# Patient Record
Sex: FEMALE | Race: BLACK OR AFRICAN AMERICAN | Employment: FULL TIME | URBAN - METROPOLITAN AREA
[De-identification: names, ages, dates, MRNs, and addresses within clinical notes are randomized per-mention and may not be internally consistent; named-entity substitution may affect disease eponyms.]

---
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
## 2020-01-12 ENCOUNTER — COMMUNICATION - HEALTHEAST (OUTPATIENT)
Dept: PEDIATRICS | Facility: CLINIC | Age: 20
End: 2020-01-12

## 2020-01-12 DIAGNOSIS — E28.2 PCOS (POLYCYSTIC OVARIAN SYNDROME): ICD-10-CM

## 2020-01-12 DIAGNOSIS — Z78.9 USES BIRTH CONTROL: ICD-10-CM

## 2020-01-30 ENCOUNTER — COMMUNICATION - HEALTHEAST (OUTPATIENT)
Dept: PEDIATRICS | Facility: CLINIC | Age: 20
End: 2020-01-30

## 2020-04-28 ENCOUNTER — COMMUNICATION - HEALTHEAST (OUTPATIENT)
Dept: SCHEDULING | Facility: CLINIC | Age: 20
End: 2020-04-28

## 2020-10-28 ENCOUNTER — COMMUNICATION - HEALTHEAST (OUTPATIENT)
Dept: PEDIATRICS | Facility: CLINIC | Age: 20
End: 2020-10-28

## 2020-10-28 DIAGNOSIS — Z78.9 USES BIRTH CONTROL: ICD-10-CM

## 2020-10-28 DIAGNOSIS — E28.2 PCOS (POLYCYSTIC OVARIAN SYNDROME): ICD-10-CM

## 2020-11-03 ENCOUNTER — COMMUNICATION - HEALTHEAST (OUTPATIENT)
Dept: PEDIATRICS | Facility: CLINIC | Age: 20
End: 2020-11-03

## 2020-11-03 DIAGNOSIS — E28.2 PCOS (POLYCYSTIC OVARIAN SYNDROME): ICD-10-CM

## 2020-11-03 DIAGNOSIS — Z78.9 USES BIRTH CONTROL: ICD-10-CM

## 2020-11-07 ENCOUNTER — COMMUNICATION - HEALTHEAST (OUTPATIENT)
Dept: PEDIATRICS | Facility: CLINIC | Age: 20
End: 2020-11-07

## 2020-11-07 DIAGNOSIS — Z78.9 USES BIRTH CONTROL: ICD-10-CM

## 2020-11-07 DIAGNOSIS — E28.2 PCOS (POLYCYSTIC OVARIAN SYNDROME): ICD-10-CM

## 2020-11-09 ENCOUNTER — OFFICE VISIT - HEALTHEAST (OUTPATIENT)
Dept: FAMILY MEDICINE | Facility: CLINIC | Age: 20
End: 2020-11-09

## 2020-11-09 ENCOUNTER — RECORDS - HEALTHEAST (OUTPATIENT)
Dept: GENERAL RADIOLOGY | Facility: CLINIC | Age: 20
End: 2020-11-09

## 2020-11-09 ENCOUNTER — COMMUNICATION - HEALTHEAST (OUTPATIENT)
Dept: FAMILY MEDICINE | Facility: CLINIC | Age: 20
End: 2020-11-09

## 2020-11-09 DIAGNOSIS — M25.522 LEFT ELBOW PAIN: ICD-10-CM

## 2020-11-09 DIAGNOSIS — M25.522 PAIN IN LEFT ELBOW: ICD-10-CM

## 2020-11-09 DIAGNOSIS — M25.532 LEFT WRIST PAIN: ICD-10-CM

## 2020-11-09 DIAGNOSIS — M25.532 PAIN IN LEFT WRIST: ICD-10-CM

## 2020-11-12 ENCOUNTER — OFFICE VISIT - HEALTHEAST (OUTPATIENT)
Dept: FAMILY MEDICINE | Facility: CLINIC | Age: 20
End: 2020-11-12

## 2020-11-12 ENCOUNTER — COMMUNICATION - HEALTHEAST (OUTPATIENT)
Dept: FAMILY MEDICINE | Facility: CLINIC | Age: 20
End: 2020-11-12

## 2020-11-12 DIAGNOSIS — S60.212D CONTUSION OF LEFT WRIST, SUBSEQUENT ENCOUNTER: ICD-10-CM

## 2020-11-12 DIAGNOSIS — S50.02XD CONTUSION OF LEFT ELBOW, SUBSEQUENT ENCOUNTER: ICD-10-CM

## 2020-11-25 ENCOUNTER — OFFICE VISIT - HEALTHEAST (OUTPATIENT)
Dept: FAMILY MEDICINE | Facility: CLINIC | Age: 20
End: 2020-11-25

## 2020-11-25 DIAGNOSIS — R51.9 CHRONIC NONINTRACTABLE HEADACHE, UNSPECIFIED HEADACHE TYPE: ICD-10-CM

## 2020-11-25 DIAGNOSIS — G89.29 CHRONIC NONINTRACTABLE HEADACHE, UNSPECIFIED HEADACHE TYPE: ICD-10-CM

## 2020-11-25 DIAGNOSIS — E28.2 PCOS (POLYCYSTIC OVARIAN SYNDROME): ICD-10-CM

## 2020-11-25 DIAGNOSIS — Z78.9 USES BIRTH CONTROL: ICD-10-CM

## 2020-11-25 DIAGNOSIS — J45.990 EXERCISE-INDUCED ASTHMA: ICD-10-CM

## 2020-11-25 RX ORDER — SUMATRIPTAN 50 MG/1
50 TABLET, FILM COATED ORAL
Qty: 10 TABLET | Refills: 3 | Status: SHIPPED | OUTPATIENT
Start: 2020-11-25

## 2020-11-25 RX ORDER — ALBUTEROL SULFATE 90 UG/1
2 AEROSOL, METERED RESPIRATORY (INHALATION) EVERY 4 HOURS PRN
Qty: 1 INHALER | Refills: 3 | Status: SHIPPED | OUTPATIENT
Start: 2020-11-25

## 2021-02-25 ENCOUNTER — NURSE TRIAGE (OUTPATIENT)
Dept: NURSING | Facility: CLINIC | Age: 21
End: 2021-02-25

## 2021-02-25 NOTE — TELEPHONE ENCOUNTER
Dee reports vomiting 3x this past hour, and had 2 episodes of putty like stools.    Vomited yellowish fluid.  On and off abdominal pain.  Nausea and dry heaves.  Slight lightheadedness.    Thinks she had food poisoning - had [facility] chicken sandwich yesterday and home made barbacoa tacos.    Per protocol, advised home care. Care advice reviewed. Patient verbalizes understanding. Advised to call back with further questions/concerns.     Jane Cao RN/[place] Nurse Advisor          Additional Information    Negative: Shock suspected (e.g., cold/pale/clammy skin, too weak to stand, low BP, rapid pulse)    Negative: Difficult to awaken or acting confused (e.g., disoriented, slurred speech)    Negative: Sounds like a life-threatening emergency to the triager    Negative: SEVERE vomiting (e.g., 6 or more times/day)    Negative: MODERATE vomiting (e.g., 3 - 5 times/day) and age > 60    Negative: Vomiting contains bile (green color)    Negative: Vomiting red blood or black (coffee ground) material    Negative: Insulin-dependent diabetes and glucose > 240 mg/dL (13 mmol/L)    Negative: Recent head injury (within 3 days)    Negative: Recent abdominal injury (within 7 days)    Negative: Drinking very little and has signs of dehydration (e.g., no urine > 12 hours, very dry mouth, very lightheaded)    Negative: Constant abdominal pain lasting > 2 hours    Negative: High-risk adult (e.g., brain tumor, V-P shunt, hernia)    Negative: Severe pain in one eye    Negative: Patient sounds very sick or weak to the triager    Negative: Vomiting and abdomen looks much more swollen than usual    Negative: Fever > 103 F (39.4 C)    Negative: Fever > 101 F (38.3 C) and over 60 years of age    Negative: Fever > 100.0 F (37.8 C) and has a weak immune system (e.g., HIV positive, cancer chemo, organ transplant, splenectomy, chronic steroids)    Negative: Fever > 100.0 F (37.8 C) and bedridden (e.g., nursing home patient, stroke, chronic  illness, recovering from surgery)    Negative: Taking any of the following medications: digoxin (Lanoxin), lithium, theophylline, phenytoin (Dilantin)    Negative: SEVERE headache and vomiting    Negative: MILD to MODERATE vomiting (e.g., 1-5 times/day) and lasts > 48 hours (2 days)    Negative: Fever present > 3 days (72 hours)    Negative: Patient wants to be seen    Negative: Vomiting a prescription medication    Negative: Alcohol abuse, known or suspected    Negative: Vomiting is a chronic symptom (recurrent or ongoing AND lasting > 4 weeks)    Negative: Vomiting    Vomiting with diarrhea    Protocols used: VOMITING-A-OH

## 2021-03-07 ENCOUNTER — HEALTH MAINTENANCE LETTER (OUTPATIENT)
Age: 21
End: 2021-03-07

## 2021-05-26 ASSESSMENT — PATIENT HEALTH QUESTIONNAIRE - PHQ9: SUM OF ALL RESPONSES TO PHQ QUESTIONS 1-9: 3

## 2021-05-27 NOTE — PROGRESS NOTES
Assessment     1. Abdominal pain, generalized    2. PCOS (polycystic ovarian syndrome)    3. Chronic back pain, unspecified back location, unspecified back pain laterality      Patient has already had an abdominal and pelvic ultrasound which were normal aside from multiple cysts in ovaries c/w PCOS.  Labs have been normal for this complaint in the past.    Plan:     Discussed at length the need to be back on birth control.  [facility] is turned back on.  Recommend barrier protection to prevent pregnancy and STDs.  OTC for back and abdominal pain.  Call with concerns or worsening.    Subjective:      HPI: Dee Sullivan is a 18 y.o. female who presents for abdominal cramping. She states that she was diagnosed last fall with PCOS.She states that she stopped taking birth control in Janurary due to a lapse in health insurance coverage. She has not had a period since February. Off OCP, she states that she has vaginal discharge around the time of her period but no bleeding. She is sexually active and has had multiple negative pregnancy tests. She states that she has been experiencing back soreness and abdominal cramping.    ROS: Positive for abdominal pain. All other systems negative    PFSH:  She attends college.   Social: No tobacco use.     No past medical history on file.  No past surgical history on file.  Patient has no known allergies.  Outpatient Medications Prior to Visit   Medication Sig Dispense Refill     albuterol (PROAIR HFA;PROVENTIL HFA;VENTOLIN HFA) 90 mcg/actuation inhaler Inhale 2 puffs every 6 (six) hours as needed for wheezing. 1 Inhaler 2     metFORMIN (GLUCOPHAGE XR) 500 MG 24 hr tablet Take 1 tablet (500 mg total) by mouth daily with supper. 30 tablet 3     norgestrel-ethinyl estradiol (LO/OVRAL) 0.3-30 mg-mcg per tablet Take 1 tablet by mouth daily. 3 Package 3     SUMAtriptan (IMITREX) 50 MG tablet Take 1 tablet (50 mg total) by mouth every 2 (two)  "hours as needed for migraine. 10 tablet 0     No facility-administered medications prior to visit.      Family History   Problem Relation Age of Onset     Hypothyroidism Mother      Hypertension Mother      Hypertension Brother      Hypertension Maternal Grandmother      Hyperlipidemia Maternal Grandmother      Arrhythmia Maternal Grandmother      No Medical Problems Father      No Medical Problems Paternal Grandmother      Asthma Brother      Social History     Social History Narrative     Not on file     Patient Active Problem List   Diagnosis   (none) - all problems resolved or deleted       Review of Systems  Remainder of 12 point ROS negative      Objective:     Vitals:    04/15/19 1559   BP: 110/72   Pulse: 78   Weight: 136 lb 14.4 oz (62.1 kg)   Height: 4' 10.86\" (1.495 m)       Physical Exam:     Alert, no acute distress.   Lungs have good air entry bilaterally, no wheezes or crackles.  No prolongation of expiratory phase.   No tachypnea, retractions, or increased work of breathing.  Cardiac exam regular rate and rhythm, normal S1 and S2.  Abdomen is soft and nontender, bowel sounds are present, no hepatosplenomegaly or mass palpable. Lower abdominal bloating and mild pain to palpation.  Musculoskeletal:  Normal exam for back  Skin, clear without rash      ADDITIONAL HISTORY SUMMARIZED (2): None.  DECISION TO OBTAIN EXTRA INFORMATION (1): None.   RADIOLOGY TESTS (1): None.  LABS (1): None.  MEDICINE TESTS (1): None.  INDEPENDENT REVIEW (2 each): None.     The visit lasted a total of 15 minutes face to face with the patient. Over 50% of the time was spent counseling and educating the patient about abdominal cramping.    I, Sarah Brooks, am scribing for and in the presence of, Dr. Land.    I, Dr. Land, personally performed the services described in this documentation, as scribed by Sarah Brooks in my presence, and it is both accurate and complete.    Total data points: 0    "

## 2021-05-27 NOTE — TELEPHONE ENCOUNTER
Triage call:   Patient calling to report intermittent abdominal pain that has gotten worse over the last week, a change in her vaginal discharge and no period since January. Has been off birth control since December due to insurance issues. Patient reports that she has taken a pregnancy test and is not pregnant. Patient reports she was diagnosed with PCOS. Pain is located mainly in her Lower abdomen but intermittently travels to her lower back and side. Patient reports that she had leg pain last night- patient reports normal bowel and bladder function. Pain is rated as moderate today. Reports that she has had nausea but no vomiting.      Triaged to be seen in the clinic today, reviewed additional care advice with patient and she verbalizes understanding. Patient warm transferred to scheduling for appointment. Appointment scheduled for today @ 4pm with PCP.     Naida Lyons RN [facility] Care Connection Triage/Med Refill 4/15/2019 1:40 PM    Reason for Disposition    MODERATE OR MILD pain that comes and goes (cramps) lasts > 24 hours    Protocols used: ABDOMINAL PAIN - FEMALE-A-OH

## 2021-05-30 VITALS — WEIGHT: 131 LBS | BODY MASS INDEX: 26.41 KG/M2 | HEIGHT: 59 IN

## 2021-05-31 VITALS — BODY MASS INDEX: 26.41 KG/M2 | WEIGHT: 131 LBS | HEIGHT: 59 IN

## 2021-05-31 VITALS — WEIGHT: 134 LBS | HEIGHT: 60 IN | BODY MASS INDEX: 26.31 KG/M2

## 2021-05-31 VITALS — HEIGHT: 59 IN | WEIGHT: 132 LBS | BODY MASS INDEX: 26.61 KG/M2

## 2021-05-31 VITALS — WEIGHT: 130 LBS | BODY MASS INDEX: 26.21 KG/M2 | HEIGHT: 59 IN

## 2021-05-31 VITALS — BODY MASS INDEX: 27.01 KG/M2 | HEIGHT: 59 IN | WEIGHT: 134 LBS

## 2021-06-01 VITALS — BODY MASS INDEX: 25.52 KG/M2 | WEIGHT: 130 LBS | HEIGHT: 60 IN

## 2021-06-01 VITALS — BODY MASS INDEX: 26.25 KG/M2 | WEIGHT: 132.2 LBS

## 2021-06-01 VITALS — WEIGHT: 132.8 LBS | BODY MASS INDEX: 26.37 KG/M2

## 2021-06-01 VITALS — WEIGHT: 131.6 LBS | BODY MASS INDEX: 26.14 KG/M2

## 2021-06-02 VITALS — WEIGHT: 136.9 LBS | HEIGHT: 59 IN | BODY MASS INDEX: 27.6 KG/M2

## 2021-06-02 NOTE — PROGRESS NOTES
[facility] Well Child Check    ASSESSMENT & PLAN  Dee Sullivan is a 19 y.o. who has normal growth and normal development.    Diagnoses and all orders for this visit:    Encounter for annual health examination  -     Chlamydia trachomatis & Neisseria gonorrhoeae, Amplified Detection  -     Hemoglobin  -     HIV Antigen/Antibody Screening [place]  -     Hearing Screening  -     Lipid Profile  -     Vitamin D, Total (25-Hydroxy)    Exercise-induced asthma  -     albuterol (PROAIR HFA;PROVENTIL HFA;VENTOLIN HFA) 90 mcg/actuation inhaler; Inhale 2 puffs every 4 (four) hours as needed for wheezing.  Dispense: 1 Inhaler; Refill: 6    PCOS (polycystic ovarian syndrome)  -     norgestrel-ethinyl estradiol (LO/OVRAL) 0.3-30 mg-mcg per tablet; Take 1 tablet by mouth daily.  Dispense: 3 Package; Refill: 3    Uses birth control  -     norgestrel-ethinyl estradiol (LO/OVRAL) 0.3-30 mg-mcg per tablet; Take 1 tablet by mouth daily.  Dispense: 3 Package; Refill: 3    Other orders  -     Influenza, Seasonal Quad, PF =/> 6months  -     Meningococcal B (PF)       Contraception Management  Happy with current OCP  Refills provided x one year  Chlamydia and HIV screening today    Intermittent Asthma - exercise induced  Stable and doing well  Benefits from albuterol use with vigorous exercise  Has spacer already  Refills provided  AAP completed and reviewed    Return to clinic in 1 year for a Well Child Check or sooner as needed       IMMUNIZATIONS/LABS  Immunizations were reviewed and orders were placed as appropriate.  I have discussed the risks and benefits of all of the vaccine components with the patient/parents.  All questions have been answered.    REFERRALS  Dental:  Recommend routine dental care as appropriate., The patient has already established care with a dentist.  Other:  No additional referrals were made at this time.    ANTICIPATORY GUIDANCE  I have reviewed age appropriate anticipatory guidance.    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 No    Do you have any forms that need to be filled out? No        Do you have any significant health concerns in your family history?: No  Family History   Problem Relation Age of Onset     Hypothyroidism Mother      Hypertension Mother      Hypertension Brother      Hypertension Maternal Grandmother      Hyperlipidemia Maternal Grandmother      Arrhythmia Maternal Grandmother      No Medical Problems Father      No Medical Problems Paternal Grandmother      Asthma Brother      Since your last visit, have there been any major changes in your family, such as a move, job change, separation, divorce, or death in the family?: No  Has a lack of transportation kept you from medical appointments?: No    Home  Who lives in your home?:  Mom And Aunt   Social History     Patient does not qualify to have social determinant information on file (likely too young).   Social History Narrative     Not on file     Do you have any concerns about losing your housing?: No  Is your housing safe and comfortable?: Yes  Do you have any  trouble with sleep?:  No    Education  What school do you child attend?:  Working   What grade are you in?:  Graduated in spring 2018  Attended one semester of college last fall but decided to take a pause after that to work and think more about her goals and plans - thinking about going into business      Eating  Do you eat regular meals including fruits and vegetables?:  yes  What are you drinking (cow's milk, water, soda, juice, sports drinks, energy drinks, etc)?: water, soda, juice, sports drinks and [place] milk  Have you been worried that you don't have enough food?: No  Do you have concerns about your body or appearance?:  No    Activities  Do you have friends?:  no  Do you get at least one hour of physical activity per day?:  no  How many hours a day are you in front of a screen other than for schoolwork (computer, TV, phone)?:  2  What do you do for exercise?:  Gym  Do you have interest/participate in community activities/volunteers/school sports?:  no    MENTAL HEALTH SCREENING  PHQ-2 Total Score: 1 (10/31/2019 12:00 PM)    PHQ-9 Total Score: 3 (10/31/2019 12:00 PM)      VISION/HEARING  Vision: Patient is already followed by a vision specialist  Hearing:  Completed. See Results    No exam data present    TB Risk Assessment:  The patient and/or parent/guardian answer positive to:  no known risk of TB    Dyslipidemia Risk Screening  Have either of your parents or any of your grandparents had a stroke or heart attack before age 55?: No  Any parents with high cholesterol or currently taking medications to treat?: No     Dental  When was the last time you saw the dentist?: 1-3 months ago   Parent/Guardian declines the fluoride varnish application today. Fluoride not applied today.    Patient Active Problem List   Diagnosis     Exercise-induced asthma       Drugs  Does the patient use tobacco/alcohol/drugs?:    Some alcohol (roughly 5 glasses wine per month)  Denies smoking, vaping or drug use    Safety  Does  "the patient have any safety concerns (peer or home)?:  no  Does the patient use safety belts, helmets and other safety equipment?:  yes    Sex  Have you ever had sex?:  Yes   Long-term partner x three years  Uses condoms and OCP for birth control  LMP - 2 days ago (currently has period)    MEASUREMENTS  Height:  4' 11\" (1.499 m)  Weight: 146 lb (66.2 kg)  BMI: Body mass index is 29.49 kg/m .  Blood Pressure: 100/60  Blood pressure percentiles are not available for patients who are 18 years or older.    PHYSICAL EXAM  GEN: alert, well appearing  EYES: clear, nl red reflex  R EAR: canal clear, TM pearly gray  L EAR: canal clear, TM pearly gray  NOSE: clear  OROPHARYNX: clear  NECK: supple, no significant LAD  CVS: RRR, no murmur  LUNGS: clear, no increased work of breathing  ABD: soft, non-tender, non-distended  : deferred  EXT: warm, well perfused, no swelling  MSK: nl muscle bulk, spine straight  NEURO: CN grossly intact, nl strength in UE and LE, nl gait, no dysmetria  SKIN: clear        Terrie Salgado MD    "

## 2021-06-02 NOTE — TELEPHONE ENCOUNTER
Refill Given    Refill given per Policy, patient informed they are overdue for Labs and Physical     Veronica Mena, Care Connection Triage/Med Refill 10/8/2019    Requested Prescriptions   Pending Prescriptions Disp Refills     norgestrel-ethinyl estradiol (LO/OVRAL) 0.3-30 mg-mcg per tablet 3 Package 3     Sig: Take 1 tablet by mouth daily.       Oral Contraceptives Protocol Passed - 10/7/2019  9:38 AM        Passed - Visit with PCP or prescribing provider visit in last 12 months      Last office visit with prescriber/PCP: 4/15/2019 Estiven Land MD OR same dept: 4/15/2019 Estiven Land MD OR same specialty: 4/15/2019 Estiven Land MD  Last physical: 7/28/2017 Last MTM visit: Visit date not found   Next visit within 3 mo: Visit date not found  Next physical within 3 mo: Visit date not found  Prescriber OR PCP: Estiven Land MD  Last diagnosis associated with med order: 1. PCOS (polycystic ovarian syndrome)  - norgestrel-ethinyl estradiol (LO/OVRAL) 0.3-30 mg-mcg per tablet; Take 1 tablet by mouth daily.  Dispense: 3 Package; Refill: 3    2. Uses birth control  - norgestrel-ethinyl estradiol (LO/OVRAL) 0.3-30 mg-mcg per tablet; Take 1 tablet by mouth daily.  Dispense: 3 Package; Refill: 3    If protocol passes may refill for 12 months if within 3 months of last provider visit (or a total of 15 months).

## 2021-06-03 VITALS
HEART RATE: 84 BPM | WEIGHT: 146 LBS | HEIGHT: 59 IN | SYSTOLIC BLOOD PRESSURE: 100 MMHG | BODY MASS INDEX: 29.43 KG/M2 | OXYGEN SATURATION: 98 % | DIASTOLIC BLOOD PRESSURE: 60 MMHG

## 2021-06-03 NOTE — TELEPHONE ENCOUNTER
----- Message from Terrie Salgado MD sent at 11/4/2019  2:37 PM CST -----  Please let Dee know that nearly all of her blood and urine lab work was normal.  Chlamydia is negative, HIV is negative, lipid panel is normal and hemoglobin is normal.  The only thing that was a bit low is her vitamin D level at 22 - I would recommend that she take a vitamin D supplement of 2000 iu daily.

## 2021-06-03 NOTE — TELEPHONE ENCOUNTER
Left patient a message to return phone call. Please relay the below message when she calls back. Thank you, Cecy

## 2021-06-03 NOTE — TELEPHONE ENCOUNTER
Pt is calling in to get message that was left by the clinic today. Message from physician was relayed to patient. Pt verbalized understanding. Advised pt to call back if she has further questions or concerns.    Pepe Greer RN Care Connection Triage/Medication Refill

## 2021-06-03 NOTE — TELEPHONE ENCOUNTER
Notes recorded by Inna Gooden CMA on 11/4/2019 at 3:51 PM CST  Called with no answer or Voicemail please try again  ------

## 2021-06-04 VITALS
OXYGEN SATURATION: 99 % | DIASTOLIC BLOOD PRESSURE: 72 MMHG | SYSTOLIC BLOOD PRESSURE: 118 MMHG | WEIGHT: 150 LBS | HEART RATE: 71 BPM | BODY MASS INDEX: 30.3 KG/M2

## 2021-06-05 VITALS
OXYGEN SATURATION: 96 % | WEIGHT: 159.06 LBS | SYSTOLIC BLOOD PRESSURE: 108 MMHG | DIASTOLIC BLOOD PRESSURE: 66 MMHG | TEMPERATURE: 97.9 F | HEART RATE: 83 BPM | BODY MASS INDEX: 32.13 KG/M2

## 2021-06-07 NOTE — TELEPHONE ENCOUNTER
"Pt calling  LL side of lip has a \"small ball like bump\", pt just noticed this over the past week,she bites it over & over.  No bleeding   Not painful  No discharge   No fever   appt made for tomorrow for telephone vist & pt will call her dentist too.    Roxanna Drake RN  [place] Nurse Advisor      Reason for Disposition    1 or 2 small sores    Protocols used: SORES-A-AH      "

## 2021-06-08 NOTE — PROGRESS NOTES
"Assessment       1. Migraines        Plan:     Trial of imitrex, diary of symptoms, call for worsening,concerns, or failure to improve in 1 week.    Subjective:      HPI: Dee Sullivan is a 16 y.o. female who presents with headache for 5 days.  Due to her headaches she has missed 1-1/2 weeks of school this month.  She is taking Excedrin 2 tablets upon onset of her headache.  This helps her headaches some.  When she has a headache coming on her vision gets blurry.  She then experiences photophobia, phonophobia, nausea, and vomiting.  She denies cold symptoms, diarrhea, and fever.  She has a history of migraines in the past.  No past medical history on file.  No past surgical history on file.  Review of patient's allergies indicates no known allergies.  No outpatient prescriptions prior to visit.     No facility-administered medications prior to visit.      No family history on file.  Social History     Social History Narrative     No narrative on file     There is no problem list on file for this patient.      Review of Systems  Remainder of 12 point ROS negative      Objective:     Vitals:    02/03/17 1601   BP: 102/60   Weight: 131 lb (59.4 kg)   Height: 4' 11\" (1.499 m)       Physical Exam:     Alert, no acute distress.   HEENT, conjunctivae are clear, TMs are without erythema, pus or fluid. Position and landmarks are normal.  Nose is clear.  Oropharynx is moist and clear, without tonsillar hypertrophy, asymmetry, exudate or lesions.  Neck is supple without adenopathy   Lungs have good air entry bilaterally, no wheezes or crackles.  No prolongation of expiratory phase.   No tachypnea, retractions, or increased work of breathing.  Cardiac exam regular rate and rhythm, normal S1 and S2.  Abdomen is soft and nontender, bowel sounds are present, no hepatosplenomegaly or mass palpable.  Skin, clear without rash      "

## 2021-06-12 NOTE — TELEPHONE ENCOUNTER
RN cannot approve Refill Request    RN can NOT refill this medication Protocol failed and NO refill given. Last office visit: Visit date not found Last Physical: 10/31/2019 Last MTM visit: Visit date not found Last visit same specialty: 4/15/2019 Estiven Land MD.  Next visit within 3 mo: Visit date not found  Next physical within 3 mo: Visit date not found      Roxanna Carrizales, Care Connection Triage/Med Refill 11/5/2020    Requested Prescriptions   Pending Prescriptions Disp Refills     LOW-OGESTREL, 28, 0.3-30 mg-mcg per tablet [Pharmacy Med Name: LOW-OGESTREL TABLETS 28] 84 tablet 0     Sig: TAKE 1 TABLET BY MOUTH DAILY       Oral Contraceptives Protocol Failed - 11/3/2020  7:11 AM        Failed - Visit with PCP or prescribing provider visit in last 12 months      Last office visit with prescriber/PCP: Visit date not found OR same dept: Visit date not found OR same specialty: 4/15/2019 Estiven Land MD  Last physical: 10/31/2019 Last MTM visit: Visit date not found   Next visit within 3 mo: Visit date not found  Next physical within 3 mo: Visit date not found  Prescriber OR PCP: Terrie Salgado MD  Last diagnosis associated with med order: 1. PCOS (polycystic ovarian syndrome)  - LOW-OGESTREL, 28, 0.3-30 mg-mcg per tablet [Pharmacy Med Name: LOW-OGESTREL TABLETS 28]; Take 1 tablet by mouth daily.  Dispense: 84 tablet; Refill: 0    2. Uses birth control  - LOW-OGESTREL, 28, 0.3-30 mg-mcg per tablet [Pharmacy Med Name: LOW-OGESTREL TABLETS 28]; Take 1 tablet by mouth daily.  Dispense: 84 tablet; Refill: 0    If protocol passes may refill for 12 months if within 3 months of last provider visit (or a total of 15 months).

## 2021-06-12 NOTE — PROGRESS NOTES
Nexplanon Insertion Procedure Note    Dee Sullivan is a 17 y.o. female is here today for Nexplanon insertion.    Procedure Details  We discussed possible complications and risks including infection, bleeding, pain, tingling, migration of the implant, failure, increased risk of ectopic should pregnancy occur and inability to remove the implant. Written consent was obtained prior to the procedure and is detailed in the patient's record. Prior to the start of the procedure a time out was taken and the identity of the patient was confirmed via name and date of birth with the patient. The correct site and the procedure to be performed were confirmed and the site marked as appropriate. The correct side was confirmed. The positioning of the patient was verified. The availability of the correct equipment was verified.      The patient's LMP was Patient's last menstrual period was 07/08/2017 (lmp unknown). and a pregnancy test was performed and confirmed negative.       Procedure Start Time: 4:25pm  Procedure Completion Time: 4:32pm    2 ml of 1% lidocaine was injected just under the skin.    The skin was prepped with betadine. Using standard technique, the implant was inserted in the inner side of the patient's left upper arm. Insertion was confirmed by visual inspection of the tip of the needle and palpation of the patient's arm.            SOAP note for Office Visit about bilateral ear pain for the past week    S: Patient has a history of swimmer's ear and has been swimming with increased frequency this summer.  About a week ago she first experienced some bilateral ear pain and decreased hearing in both ears.  She denies any discharge report sometimes she does have some stains on her pillows in the morning.  She denies any blood from her ears and reports not trying anything in her ears and has not tried anything over-the-counter.  She denies any fevers or chills or other cold symptoms.  O: BP 98/60, Pulse 74,  Weight-132 pounds, O2 sat-100%  Exam: HEENT: Noninjected conjunctiva.  Bilateral TMs are visualized intact and without erythema but she has moderate erythema in bilateral canals and moderate amount of white discharge in both canals.  No discrete lesion is seen in the canals and patient's hearing is generally intact.  Oropharynx is clear without erythema.  She has no cervical adenopathy.  A/P:  Otitis Externa-CiproDex Otic: 4 drops to each ear bid for 7 days.10ml bottle no refills dispensed.         Complications: none.    Plan:  Follow-up was recommended as needed and removal      Sierra Martinez MD

## 2021-06-12 NOTE — PROGRESS NOTES
"S:  Dee is here with her mother for follow-up on her Nexplanon that she had placed 5 days ago.  She is concerned about the bruising and is unsure if it is normal.  She endorses general discomfort/soreness in that arm.  She has taken Ibuprofen PRN for the discomfort - last dose last night.    Denies fever, chills, redness, discharge from insertion site.    O:  /62 (Patient Site: Right Arm, Patient Position: Sitting, Cuff Size: Adult Regular)  Pulse 80  Ht 4' 11.25\" (1.505 m)  Wt 131 lb (59.4 kg)  LMP 08/06/2017  Breastfeeding? No  BMI 26.24 kg/m2  Left arm:  Able to visualize Nexplanon insertion site - healing well, point of insertion barely visible.    Nexplanon easily palpable.  Skin over the Nexplanon is mostly yellowish in color, the skin both below and in the proximal and distal area of the Nexplanon have red/purple areas of bruising with the distal area being ~1cm by 1cm, and the proximal area being 2cm by 1cm.      A:  Bruising after Nexplanon insertion  Nexplanon present    P:  Education provided on insertion procedure and how it can cause bruising.  Reassurance provided.  Offered to outline bruised area to monitor for expansion - patient declines, but will keep an eye on it.  Showed her landmarks (ie. Elbow crease) to observe for expanded bruising.    Reviewed warning signs of infection, not feeling Nexplanon, or extreme discomfort.  Continue Ibuprofen PRN.  F-up PRN if s/sx worsen    MAGED Cantrell, HELEN  8/22/2017  3:38 PM    Total time spent with patient 10 minutes, >50% counseling, education and coordination of care.      "

## 2021-06-12 NOTE — TELEPHONE ENCOUNTER
RN cannot approve Refill Request    RN can NOT refill this medication PCP messaged that patient is overdue for Office Visit. Last office visit: Visit date not found Last Physical: 10/31/2019 Last MTM visit: Visit date not found Last visit same specialty: 4/15/2019 Estiven Land MD.  Next visit within 3 mo: Visit date not found  Next physical within 3 mo: Visit date not found      Giulia Robles, [facility] Triage/Med Refill 11/8/2020    Requested Prescriptions   Pending Prescriptions Disp Refills     LOW-OGESTREL, 28, 0.3-30 mg-mcg per tablet [Pharmacy Med Name: LOW-OGESTREL TABLETS 28] 84 tablet 0     Sig: TAKE 1 TABLET BY MOUTH DAILY       Oral Contraceptives Protocol Failed - 11/7/2020 11:28 AM        Failed - Visit with PCP or prescribing provider visit in last 12 months      Last office visit with prescriber/PCP: Visit date not found OR same dept: Visit date not found OR same specialty: 4/15/2019 Estiven Land MD  Last physical: 10/31/2019 Last MTM visit: Visit date not found   Next visit within 3 mo: Visit date not found  Next physical within 3 mo: Visit date not found  Prescriber OR PCP: Terrie Salgado MD  Last diagnosis associated with med order: 1. PCOS (polycystic ovarian syndrome)  - LOW-OGESTREL, 28, 0.3-30 mg-mcg per tablet [Pharmacy Med Name: LOW-OGESTREL TABLETS 28]; TAKE 1 TABLET BY MOUTH DAILY  Dispense: 84 tablet; Refill: 0    2. Uses birth control  - LOW-OGESTREL, 28, 0.3-30 mg-mcg per tablet [Pharmacy Med Name: LOW-OGESTREL TABLETS 28]; TAKE 1 TABLET BY MOUTH DAILY  Dispense: 84 tablet; Refill: 0    If protocol passes may refill for 12 months if within 3 months of last provider visit (or a total of 15 months).

## 2021-06-12 NOTE — PROGRESS NOTES
Patient ID: Dee Sullivan is a 20 y.o. female.  /72   Pulse 71   Wt 150 lb (68 kg)   SpO2 99%   BMI 30.30 kg/m      Assessment/Plan:                   Diagnoses and all orders for this visit:    Left wrist pain  -     XR Wrist Left 3 or More VWS; Future; Expected date: 11/09/2020    Left elbow pain  -     XR Elbow Left 3 or More VWS; Future; Expected date: 11/09/2020           DISCUSSION  Today to complete x-ray.  X-rays were completed at a later date.  Information was passed along to patient that there was no evidence of any fracture.  Patient is encouraged to proceed with more conservative care including rest, ice as needed, over-the-counter analgesic medication, maintenance of range of motion, protection from additional injury and follow-up if indicated.  Subjective:     HPI    Dee Sullivan is a 20 y.o. female states she went rollerblading yesterday in the warm November weather and fell multiple times.  Patient reports that last time she fell fell onto her left elbow.  She states it took several minutes before she was able to move it.  She has noticed more pain in the elbow today and some swelling.  She also notes a sharp pain in her left wrist near the distal aspect of the ulna.  She is having some difficulty moving the wrist and elbow.  No distinct significant bruising has been noted no redness.  Patient has mild scrapes on both palmar surfaces of the hands.  He does not report significant pain in the right upper extremity.    Review of Systems  Complete review of systems is obtained.  Other than the specific considerations noted above complete review of systems is negative.          Objective:   Medications:  Current Outpatient Medications   Medication Sig Note     albuterol (PROAIR HFA;PROVENTIL HFA;VENTOLIN HFA) 90 mcg/actuation inhaler Inhale 2 puffs every 4 (four) hours as needed for wheezing.      LOW-OGESTREL, 28, 0.3-30 mg-mcg per tablet TAKE 1 TABLET BY MOUTH DAILY      SUMAtriptan  (IMITREX) 50 MG tablet Take 1 tablet (50 mg total) by mouth every 2 (two) hours as needed for migraine. 5/24/2018: prn       Allergies:  No Known Allergies    Tobacco:   reports that she has never smoked. She has never used smokeless tobacco.     Physical Exam          /72   Pulse 71   Wt 150 lb (68 kg)   SpO2 99%   BMI 30.30 kg/m        General: Patient alert no signs of distress    Full range of motion noted in the fingers including both flexion and extension.  Good range of motion in both wrists.  Patient has full flexion extension as well as radial and ulnar deviation.  She can pronate and supinate both forearms without significant difficulty.  She has difficulty fully flexing the left elbow.  There is some swelling and bruising noted around the left elbow.  She can fully extend the left elbow.  There is tenderness focally around the olecranon.

## 2021-06-12 NOTE — TELEPHONE ENCOUNTER
Medication:   Disp Refills Start End DUSTY   norgestrel-ethinyl estradiol (LO/OVRAL) 0.3-30 mg-mcg per tablet 3 Package 3 10/31/2019  No   Sig - Route: Take 1 tablet by mouth daily. - Oral       Pharmacy:[id number] - [place] [address] AT [place] [address]    Last Office Visit: 10/31/2019

## 2021-06-12 NOTE — TELEPHONE ENCOUNTER
She came in for 220pm visit but left around 305pm while waiting for an xray as our tech was busy helping a difficult patient.    Called, left message for Dee.    She can come in here and get the xray done or go to [facility] for outpatient xrays.    Asked her to call [phone] for an appt or to stop in and we would try to get it done for her.    Offered apologies for her wait.    To Dr Jolly as sai

## 2021-06-12 NOTE — PROGRESS NOTES
[facility] Well Child Check    ASSESSMENT & PLAN  Dee Sullivan is a 17  y.o. 2  m.o. who has normal growth and normal development.    Diagnoses and all orders for this visit:    Health care maintenance  -     Ambulatory referral to Obstetrics / Gynecology  -     Vitamin D, Total (25-Hydroxy)  -     Cholesterol, Total  -     Hearing Screening  -     Vision Screening  -     Thyroid Stimulating Hormone (TSH)  -     T4, Free    Failed vision screen  -     Amb referral to Pediatric Ophthalmology    Overweight    Vitamin D deficiency    The following nutrition counseling was performed this visit:  recommendation to change food and drink intake  The following physical activity counseling was performed this visit: patient advised about exercise  Will give 50,000 IU daily for 1 month and then recheck vitamin D.    Return to clinic in 1 year for a Well Child Check or sooner as needed    IMMUNIZATIONS/LABS  No immunizations due today.    REFERRALS  Dental:  The patient has already established care with a dentist.  Other:  Referrals were made for OB/GYN for birth control.    ANTICIPATORY GUIDANCE  I have reviewed age appropriate anticipatory guidance.  Parenting:  Homework  Nutrition:  Body Image  Play and Communication:  Hobbies and Read Books  Health:  Activity (>45 min/day), Sleep, Sun Screen and Dental Care  Safety:  Seat Belts, Bike/Motorcycle Helmets and Outdoor Safety Avoiding Sun Exposure  Sexuality:  Contraception    HEALTH HISTORY  Do you have any concerns that you'd like to discuss today?: 1. Disuss birth control     Contraception: She is interested in starting birth control. She would like to know her options. She is not currently sexually active.     Roomed by: Thea Alston CMA    Accompanied by Mother    Refills needed? No    Do you have any forms that need to be filled out? No        Do you have any significant health concerns in your family history?: Yes: Mother: hypothyorid, BP up and down  MAternal GM:  hypertension and Brother has hypertension- he is 25  Family History   Problem Relation Age of Onset     Hypothyroidism Mother      Hypertension Mother      Hypertension Brother      Hypertension Maternal Grandmother      Hyperlipidemia Maternal Grandmother      Arrhythmia Maternal Grandmother      No Medical Problems Father      No Medical Problems Paternal Grandmother      Asthma Brother      Since your last visit, have there been any major changes in your family, such as a move, job change, separation, divorce, or death in the family?: No    Home  Who lives in your home?:  Mother, brother- Delfin and step-dad  Social History     Social History Narrative     Do you have any trouble with sleep?:  No    Education  What school does your child attend?:  [employer]  What grade is your child in?:  Senior  How does the patient perform in school (grades, behavior, attention, homework?: none   She eats fruit once daily. She does not drink water.     Eating  Does patient eat regular meals including fruits and vegetables?:  yes  What is the patient drinking (cow's milk, water, soda, juice, sports drinks, energy drinks, etc)?: water, coffee  Does patient have concerns about body or appearance?:  No    Activities  Does the patient have friends?:  yes  Does the patient get at least one hour of physical activity per day?:  yes  Does the patient have less than 2 hours of screen time per day (aside from homework)?:  yes  What does your child do for exercise?:  Go to the gym   Does the patient have interest/participate in community activities/volunteers/school sports?:  yes, [employer]    MENTAL HEALTH SCREENING  PHQ-2 Total Score: 1 (7/28/2017  7:00 AM)  No Data Recorded    VISION/HEARING  Vision: Completed. See Results  Hearing:  Completed. See Results     Hearing Screening    125Hz 250Hz 500Hz 1000Hz 2000Hz 3000Hz 4000Hz 6000Hz 8000Hz   Right ear:   25 20 20  20     Left ear:   25 20 20  20        Visual Acuity  "Screening    Right eye Left eye Both eyes   Without correction: 10/10 10/10    With correction:      Comments: Failed Plus Lens Screen.    Gave card for [facility].      TB Risk Assessment:  The patient and/or parent/guardian answer positive to:  patient and/or parent/guardian answer 'no' to all screening TB questions    Dental  Is your child being seen by a dentist?  Yes  Flouride Varnish Application Screening  Is child seen by dentist?     Yes    There is no problem list on file for this patient.      Drugs  Does the patient use tobacco/alcohol/drugs?:  Not asked    Safety  Does the patient have any safety concerns (peer or home)?:  no  Does the patient use safety belts, helmets and other safety equipment?:  Not asked    Sex  Is the patient sexually active?:  no    MEASUREMENTS  Height:  4' 11.25\" (1.505 m)  Weight: 134 lb (60.8 kg)  BMI: Body mass index is 26.84 kg/(m^2).  Blood Pressure: 94/50  Blood pressure percentiles are 8 % systolic and 9 % diastolic based on NHBPEP's 4th Report. Blood pressure percentile targets: 90: 122/79, 95: 126/83, 99 + 5 mmH/95.    PHYSICAL EXAM  Constitutional: She appears well-developed and well-nourished.   HEENT: Head: Normocephalic.    Right Ear: Tympanic membrane, external ear and canal normal.    Left Ear: Tympanic membrane, external ear and canal normal.    Nose: Nose normal.    Mouth/Throat: Mucous membranes are moist. Oropharynx is clear.    Eyes: Conjunctivae and lids are normal. Pupils are equal, round, and reactive to light. Optic discs are sharp.   Neck: Neck supple. No tenderness is present.   Cardiovascular: Normal rate and regular rhythm. No murmur heard.  Pulses: Femoral pulses are 2+ bilaterally.   Pulmonary/Chest: Effort normal and breath sounds normal. There is normal air entry. Breast development is normal.  Yovani stage 5.   Abdominal: Soft. There is no hepatosplenomegaly. No inguinal hernia.   Musculoskeletal: Normal range of motion. Normal strength " and tone. No abnormalities. Spine is straight. Normal duck walk.  Normal heel to toe walk.   : Normal external female genitalia.  Yovani stage 5.   Neurological: She is alert. She has normal reflexes. Gait normal.   Psychiatric: She has a normal mood and affect. Her speech is normal and behavior is normal.  Skin: Clear. No rashes.     ADDITIONAL HISTORY SUMMARIZED (2): None.  DECISION TO OBTAIN EXTRA INFORMATION (1): None.   RADIOLOGY TESTS (1): None.  LABS (1): None.  MEDICINE TESTS (1): None.  INDEPENDENT REVIEW (2 each): None.       The visit lasted a total of 18 minutes face to face with the patient. Over 50% of the time was spent counseling and educating the patient about birth control.    I, Marycruz Padilla, am scribing for and in the presence of, Dr. Land.    I, Estiven Land, personally performed the services described in this documentation, as scribed by Marycruz Padilla in my presence, and it is both accurate and complete.

## 2021-06-12 NOTE — TELEPHONE ENCOUNTER
When I look at chart. It looks that OCP was ordered on 10/29 by Emery for 1 month. FYI. So I did not fill this and await for you on Monday. Falguni Crook MD 11/6/2020 9:59 AM

## 2021-06-13 NOTE — PROGRESS NOTES
"Dee Sullivan is a 20 y.o. female who is being evaluated via a billable telephone visit.      The patient has been notified of following:     \"This telephone visit will be conducted via a call between you and your physician/provider. We have found that certain health care needs can be provided without the need for a physical exam.  This service lets us provide the care you need with a short phone conversation.  If a prescription is necessary we can send it directly to your pharmacy.  If lab work is needed we can place an order for that and you can then stop by our lab to have the test done at a later time.    Telephone visits are billed at different rates depending on your insurance coverage. During this emergency period, for some insurers they may be billed the same as an in-person visit.  Please reach out to your insurance provider with any questions.    If during the course of the call the physician/provider feels a telephone visit is not appropriate, you will not be charged for this service.\"    Patient has given verbal consent to a Telephone visit? Yes    What phone number would you like to be contacted at? [phone]    Patient would like to receive their AVS by AVS Preference: Mail a copy.    Additional provider notes:   Assessment and Plan:     1. Contusion of left elbow, subsequent encounter     2. Contusion of left wrist, subsequent encounter       Discussed symptomatic treatment including rest, ice, elevation, ibuprofen.  I encouraged her to wear an over-the-counter wrist brace for support.  If no improvement in symptoms in the next week, she will consider going to [facility] for further evaluation and repeat x-rays.  She is content with the plan.    Subjective:     Dee is a 20 y.o. female presenting for a telephone visit to for follow-up on left upper extremity contusion.  Patient was rollerblading on 11/8/2020 when she fell and landed on her left elbow and wrist.  Patient was seen in " clinic on 11/9/2020 where x-rays showed no acute fracture or dislocation.  Patient states there was initial swelling with the injury, but the swelling has improved.  She has had less soreness within her elbow, but states her wrist is still painful.  She complains of a constant ache which is exacerbated with hyperextension or twisting her wrist.  She has been taking ibuprofen.  She has not tried wearing a brace yet.    Review of Systems: A complete 14 point review of systems was obtained and is negative or as stated in the history of present illness.    Social History     Socioeconomic History     Marital status: Single     Spouse name: Not on file     Number of children: Not on file     Years of education: Not on file     Highest education level: Not on file   Occupational History     Not on file   Social Needs     Financial resource strain: Not on file     Food insecurity     Worry: Not on file     Inability: Not on file     Transportation needs     Medical: Not on file     Non-medical: Not on file   Tobacco Use     Smoking status: Never Smoker     Smokeless tobacco: Never Used     Tobacco comment: no secondhand exposure   Substance and Sexual Activity     Alcohol use: Not on file     Drug use: Not on file     Sexual activity: Not on file   Lifestyle     Physical activity     Days per week: Not on file     Minutes per session: Not on file     Stress: Not on file   Relationships     Social connections     Talks on phone: Not on file     Gets together: Not on file     Attends Mosque service: Not on file     Active member of club or organization: Not on file     Attends meetings of clubs or organizations: Not on file     Relationship status: Not on file     Intimate partner violence     Fear of current or ex partner: Not on file     Emotionally abused: Not on file     Physically abused: Not on file     Forced sexual activity: Not on file   Other Topics Concern     Not on file   Social History Narrative     Not on  file       Active Ambulatory Problems     Diagnosis Date Noted     Exercise-induced asthma 10/31/2019     PCOS (polycystic ovarian syndrome) 10/31/2019     Resolved Ambulatory Problems     Diagnosis Date Noted     Nexplanon in place 08/22/2017     Past Medical History:   Diagnosis Date     Asthma        Family History   Problem Relation Age of Onset     Hypothyroidism Mother      Hypertension Mother      Hypertension Brother      Hypertension Maternal Grandmother      Hyperlipidemia Maternal Grandmother      Arrhythmia Maternal Grandmother      No Medical Problems Father      No Medical Problems Paternal Grandmother      Asthma Brother        Objective:     There were no vitals taken for this visit.    Patient is alert and is speaking clearly.  She does not sound short of breath or have a cough.    Phone call duration:  6 minutes    Clarita Bee CNP

## 2021-06-13 NOTE — TELEPHONE ENCOUNTER
----- Message from Vivek Jolly MD sent at 11/10/2020  3:15 PM CST -----  Please call patient: The x-ray of both the elbow and wrist do not show any evidence of fracture or any bone problems.  Likely her injuries are representative of a sprain and bruising.  She should try to protect the areas.  She can apply ice and/or take over-the-counter pain medication such as ibuprofen or acetaminophen as she sees fit.  If the pain is not gradually subsiding and certainly if pain is significant 1 week from now she should let us know.

## 2021-06-13 NOTE — TELEPHONE ENCOUNTER
ZIYAD Deleon I was out of the office on Tuesday and Wednesday.  It does not appear this patient was contacted in regards to these results but had a follow-up visit today with Clarita Bee CNP.

## 2021-06-13 NOTE — PROGRESS NOTES
ASSESSMENT/PLAN:  Dee was seen today for med check and medication refill.    Diagnoses and all orders for this visit:    Exercise-induced asthma  -     albuterol (PROAIR HFA;PROVENTIL HFA;VENTOLIN HFA) 90 mcg/actuation inhaler; Inhale 2 puffs every 4 (four) hours as needed for wheezing.  Declined flu shot    PCOS (polycystic ovarian syndrome)  -     norgestrel-ethinyl estradioL (LOW-OGESTREL, 28,) 0.3-30 mg-mcg per tablet; Take 1 tablet by mouth daily.    Uses birth control  -     norgestrel-ethinyl estradioL (LOW-OGESTREL, 28,) 0.3-30 mg-mcg per tablet; Take 1 tablet by mouth daily.    Chronic nonintractable headache, unspecified headache type  -     SUMAtriptan (IMITREX) 50 MG tablet; Take 1 tablet (50 mg total) by mouth every 2 (two) hours as needed for migraine.      SUBJECTIVE:    Dee Sullivan is a 20 y.o. female who came in today     Has sports induced asthma  Can't remember when she last albuterol   Needs refill of abuterol  Not a smoker    Oral contraceptive   Monthly periods    H/o headaches  Can't remember when last used it    Review of Systems (except those mentioned above)  Constitutional: Negative.   HENT: Negative.   Eyes: Negative.   Respiratory: Negative.   Cardiovascular: Negative.   Gastrointestinal: Negative.   Endocrine: Negative.   Genitourinary: Negative.   Musculoskeletal: Negative.   Skin: Negative.   Allergic/Immunologic: Negative.   Neurological: Negative.   Hematological: Negative.   Psychiatric/Behavioral: Negative.     Patient Active Problem List    Diagnosis Date Noted     Exercise-induced asthma 10/31/2019     PCOS (polycystic ovarian syndrome) 10/31/2019     No Known Allergies  Current Outpatient Medications   Medication Sig Dispense Refill     albuterol (PROAIR HFA;PROVENTIL HFA;VENTOLIN HFA) 90 mcg/actuation inhaler Inhale 2 puffs every 4 (four) hours as needed for wheezing. 1 Inhaler 3     norgestrel-ethinyl estradioL (LOW-OGESTREL, 28,) 0.3-30 mg-mcg per tablet Take 1  tablet by mouth daily. 84 tablet 3     SUMAtriptan (IMITREX) 50 MG tablet Take 1 tablet (50 mg total) by mouth every 2 (two) hours as needed for migraine. 10 tablet 3     No current facility-administered medications for this visit.      Past Medical History:   Diagnosis Date     Asthma      No past surgical history on file.  Social History     Socioeconomic History     Marital status: Single     Spouse name: None     Number of children: None     Years of education: None     Highest education level: None   Occupational History     None   Social Needs     Financial resource strain: None     Food insecurity     Worry: None     Inability: None     Transportation needs     Medical: None     Non-medical: None   Tobacco Use     Smoking status: Never Smoker     Smokeless tobacco: Never Used     Tobacco comment: no secondhand exposure   Substance and Sexual Activity     Alcohol use: None     Drug use: None     Sexual activity: None   Lifestyle     Physical activity     Days per week: None     Minutes per session: None     Stress: None   Relationships     Social connections     Talks on phone: None     Gets together: None     Attends Orthodox service: None     Active member of club or organization: None     Attends meetings of clubs or organizations: None     Relationship status: None     Intimate partner violence     Fear of current or ex partner: None     Emotionally abused: None     Physically abused: None     Forced sexual activity: None   Other Topics Concern     None   Social History Narrative     None     Family History   Problem Relation Age of Onset     Hypothyroidism Mother      Hypertension Mother      Hypertension Brother      Hypertension Maternal Grandmother      Hyperlipidemia Maternal Grandmother      Arrhythmia Maternal Grandmother      No Medical Problems Father      No Medical Problems Paternal Grandmother      Asthma Brother          OBJECTIVE:    Vitals:    11/25/20 1611   BP: 108/66   Patient Site: Left  Arm   Patient Position: Sitting   Cuff Size: Adult Regular   Pulse: 83   Temp: 97.9  F (36.6  C)   SpO2: 96%   Weight: 159 lb 1 oz (72.2 kg)     Body mass index is 32.13 kg/m .    Physical Exam:  Constitutional: Patient is oriented to person, place, and time. Patient appears well-developed and well-nourished. No distress.   Head: Normocephalic and atraumatic.   Right Ear: External ear normal.   Left Ear: External ear normal.   Nose: Nose normal.   Eyes: Conjunctivae and EOM are normal. Right eye exhibits no discharge. Left eye exhibits no discharge. No scleral icterus.   Neurological: Patient is alert and oriented to person, place, and time. No cranial nerve deficit. Coordination normal.   Skin: No rash noted. Patient is not diaphoretic. No erythema. No pallor.

## 2021-06-14 NOTE — PROGRESS NOTES
"Assessment / Impression     1. Encounter for other general counseling or advice on contraception         Plan:     1. Discussed common side effects of nexplanon and how irregular bleeding pattern will improve 50% of the time after three months. Patient is willing to try low dose OCP to further help control bleeding pattern. RX sent to pharmacy. Discussed risks and benefits to patient and mom. They expressed understanding.     Subjective:      HPI: Dee Sullivan is a 17 y.o. female with  Concerns about bleeding on nexplanon is here today to discuss removal. Nexplanon was placed 8/17. Patient reports she has had irregular bleeding and light spotting since insertion. She denies other side effects. She and her mom report that most important thing to both of them is avoiding an unplanned pregnancy. She has not sought care to discuss this concern with another healthcare provider. She remains sexually monogamous with her boyfriend. She has not tried other forms of birth control.       Review of Systems  Pertinent items are noted in HPI.       Objective:     /68 (Patient Site: Right Arm, Patient Position: Sitting, Cuff Size: Adult Regular)  Pulse 66  Ht 4' 11.25\" (1.505 m)  Wt 130 lb (59 kg)  SpO2 98%  BMI 26.04 kg/m2  Physical Examination: General appearance - alert, well appearing, and in no distress  Arm: Nexplanon palpated in place in left upper inner arm.   Psychiatric: Normal affect. Does not appear anxious or depressed.  The rest of the entire 20 minute visit greater than 50% of our visit was spent face to face counseling contraception side effects and options.   "

## 2021-06-16 PROBLEM — J45.990 EXERCISE-INDUCED ASTHMA: Status: ACTIVE | Noted: 2019-10-31

## 2021-06-16 PROBLEM — E28.2 PCOS (POLYCYSTIC OVARIAN SYNDROME): Status: ACTIVE | Noted: 2019-10-31

## 2021-06-16 NOTE — PROGRESS NOTES
Nexplanon Removal Procedure Note    Dee Sullivan is a 17 y.o. female is here today for removal of Nexplanon.    Procedure Details  Risks and benefits were discussed with the patient. We discussed possible complications and risks including infection, bleeding, pain, tingling. Written consent was obtained prior to the procedure and is detailed in the patient's record. Prior to the start of the procedure a time out was taken and the identity of the patient was confirmed via name and date of birth with the patient. The correct site and the procedure to be performed were confirmed and the site marked as appropriate. The correct side was confirmed. The availability of the correct equipment was verified.          Procedure Start Time: 8:50am  Procedure Completion Time: 8:59am    2 ml of 1% lidocaine was injected just under the skin.    The skin was prepped with betadine.    The implant was located by palpation and a 2-3 mm incision was made in the longitudinal direction of the  left arm at the tip of the implant closest to the elbow. The implant was then grasped and removed with forceps. Complete removal of the implant was confirmed by measuring its full length of 4 mm.    Complications: none.    Plan:  Follow-up was recommended as needed. Discussed other contraception options and patient declining right now. Will use condoms. Reviewed emergency contraception use too.   Sierra Martinez MD

## 2021-06-17 NOTE — PATIENT INSTRUCTIONS - HE
Patient Instructions by Terrie Salgado MD at 10/31/2019 11:00 AM     Author: Terrie Salgado MD Service: -- Author Type: Physician    Filed: 10/31/2019 11:52 AM Encounter Date: 10/31/2019 Status: Addendum    : Terrie Salgado MD (Physician)    Related Notes: Original Note by Terrie Salgado MD (Physician) filed at 10/31/2019 11:51 AM             10/31/2019  Wt Readings from Last 1 Encounters:   10/31/19 146 lb (66.2 kg) (77 %, Z= 0.74)*     * Growth percentiles are based on CDC (Girls, 2-20 Years) data.       Acetaminophen Dosing Instructions  (May take every 4-6 hours)      WEIGHT   AGE Infant/Children's  160mg/5ml Children's   Chewable Tabs  80 mg each Moose Strength  Chewable Tabs  160 mg     Milliliter (ml) Soft Chew Tabs Chewable Tabs   6-11 lbs 0-3 months 1.25 ml     12-17 lbs 4-11 months 2.5 ml     18-23 lbs 12-23 months 3.75 ml     24-35 lbs 2-3 years 5 ml 2 tabs    36-47 lbs 4-5 years 7.5 ml 3 tabs    48-59 lbs 6-8 years 10 ml 4 tabs 2 tabs   60-71 lbs 9-10 years 12.5 ml 5 tabs 2.5 tabs   72-95 lbs 11 years 15 ml 6 tabs 3 tabs   96 lbs and over 12 years   4 tabs     Ibuprofen Dosing Instructions- Liquid  (May take every 6-8 hours)      WEIGHT   AGE Concentrated Drops   50 mg/1.25 ml Infant/Children's   100 mg/5ml     Dropperful Milliliter (ml)   12-17 lbs 6- 11 months 1 (1.25 ml)    18-23 lbs 12-23 months 1 1/2 (1.875 ml)    24-35 lbs 2-3 years  5 ml   36-47 lbs 4-5 years  7.5 ml   48-59 lbs 6-8 years  10 ml   60-71 lbs 9-10 years  12.5 ml   72-95 lbs 11 years  15 ml       Ibuprofen Dosing Instructions- Tablets/Caplets  (May take every 6-8 hours)    WEIGHT AGE Children's   Chewable Tabs   50 mg Moose Strength   Chewable Tabs   100 mg Moose Strength   Caplets    100 mg     Tablet Tablet Caplet   24-35 lbs 2-3 years 2 tabs     36-47 lbs 4-5 years 3 tabs     48-59 lbs 6-8 years 4 tabs 2 tabs 2 caps   60-71 lbs 9-10 years 5 tabs 2.5 tabs 2.5 caps   72-95 lbs 11 years 6 tabs 3  tabs 3 caps          Patient Education      [id number] HANDOUT- PATIENT  18 THROUGH 21 YEAR VISITS  Here are some suggestions from [employer] experts that may be of value to your family.     HOW YOU ARE DOING  Enjoy spending time with your family.  Find activities you are really interested in, such as sports, theater, or volunteering.  Try to be responsible for your schoolwork or work obligations.  Always talk through problems and never use violence.  If you get angry with someone, try to walk away.  If you feel unsafe in your home or have been hurt by someone, let us know. Hotlines and community agencies can also provide confidential help.  Talk with us if you are worried about your living or food situation. Community agencies and programs such as SNAP can help.  Dont smoke, vape, or use drugs. Avoid people who do when you can. Talk with us if you are worried about alcohol or drug use in your family.    YOUR DAILY LIFE  Visit the dentist at least twice a year.  Brush your teeth at least twice a day and floss once a day.  Be a healthy eater.  Have vegetables, fruits, lean protein, and whole grains at meals and snacks.  Limit fatty, sugary, salty foods that are low in nutrients, such as candy, chips, and ice cream.  Eat when youre hungry. Stop when you feel satisfied.  Eat breakfast.  Drink plenty of water.  Make sure to get enough calcium every day.  Have 3 or more servings of low-fat (1%) or fat-free milk and other low-fat dairy products, such as yogurt and cheese.  Women: Make sure to eat foods rich in folate, such as fortified grains and dark- green leafy vegetables.  Aim for at least 1 hour of physical activity every day.  Wear safety equipment when you play sports.  Get enough sleep.  Talk with us about managing your health care and insurance as an adult.    YOUR FEELINGS  Most people have ups and downs. If you are feeling sad, depressed, nervous, irritable, hopeless, or angry, let us know or reach out  to another health care professional.  Figure out healthy ways to deal with stress.  Try your best to solve problems and make decisions on your own.  Sexuality is an important part of your life. If you have any questions or concerns, we are here for you.    HEALTHY BEHAVIOR CHOICES  Avoid using drugs, alcohol, tobacco, steroids, and diet pills. Support friends who choose not to use.  If you use drugs or alcohol, let us know or talk with another trusted adult about it. We can help you with quitting or cutting down on your use.  Make healthy decisions about your sexual behavior.  If you are sexually active, always practice safe sex. Always use birth control along with a condom to prevent pregnancy and sexually transmitted infections.  All sexual activity should be something you want. No one should ever force or try to convince you.  Protect your hearing at work, home, and concerts. Keep your earbud volume down.    STAYING SAFE  Always be a safe and cautious .  Insist that everyone use a lap and shoulder seat belt.  Limit the number of friends in the car and avoid driving at night.  Avoid distractions. Never text or talk on the phone while you drive.  Do not ride in a vehicle with someone who has been using drugs or alcohol.  If you feel unsafe driving or riding with someone, call someone you trust to drive you.  Wear helmets and protective gear while playing sports. Wear a helmet when riding a bike, a motorcycle, or an ATV or when skiing or skateboarding.  Always use sunscreen and a hat when youre outside.  Fighting and carrying weapons can be dangerous. Talk with your parents, teachers, or doctor about how to avoid these situations.      Helpful Resources:  National Domestic Violence Hotline: 314.524.5887   Consistent with Bright Futures: Guidelines for Health Supervision of Infants, Children, and Adolescents, 4th Edition  For more information, go to https://brightfutures.aap.org.

## 2021-06-19 NOTE — LETTER
Letter by Terrie Salgado MD at      Author: Terrie Salgado MD Service: -- Author Type: --    Filed:  Encounter Date: 10/31/2019 Status: Signed       My Asthma Action Plan     Name: Dee Sullivan   YOB: 2000  Date: 10/31/2019   My doctor: Terrie Salgado MD   My clinic: [facility]        My Rescue Medicine:   Albuterol (Proair/Ventolin/Proventil HFA) 2-4 puffs EVERY 4 HOURS as needed. Use a spacer if recommended by your provider.   My Asthma Severity:   Intermittent/Exercise Induced  Know your asthma triggers: exercise or sports             GREEN ZONE   Good Control    I feel good    No cough or wheeze    Can work, sleep and play without asthma symptoms         1. If exercise triggers your asthma, take your rescue medication    15 minutes before exercise or sports, and    During exercise if you have asthma symptoms  2. Spacer to use with inhaler: If you have a spacer, make sure to use it with your inhaler             YELLOW ZONE Getting Worse  I have ANY of these:    I do not feel good    Cough or wheeze    Chest feels tight    Wake up at night   1. Start taking your rescue medicine:    every 20 minutes for up to 1 hour. Then every 4 hours for 24-48 hours.  2. If you stay in the Yellow Zone for more than 12-24 hours, contact your doctor.  3. If you do not return to the Green Zone in 12-24 hours or you get worse, start taking your oral steroid medicine if prescribed by your provider.           RED ZONE Medical Alert - Get Help  I have ANY of these:    I feel awful    Medicine is not helping    Breathing getting harder    Trouble walking or talking    Nose opens wide to breathe     1. Take your rescue medicine NOW  2. If your provider has prescribed an oral steroid medicine, start taking it NOW  3. Call your doctor NOW  4. If you are still in the Red Zone after 20 minutes and you have not reached your doctor:    Take your rescue medicine again and    Call  145 or go to the emergency room right away    See your regular doctor within 2 weeks of an Emergency Room or Urgent Care visit for follow-up treatment.          Annual Reminders:  Meet with Asthma Educator,  Flu Shot in the Fall, consider Pneumonia Vaccination for patients with asthma (aged 19 and older).    Pharmacy:   [id number] [place] [address] AT [facility] [address] [address] [place] [address]  Phone: [phone] Fax: [phone]                            Asthma Triggers  How To Control Things That Make Your Asthma Worse    Triggers are things that make your asthma worse.  Look at the list below to help you find your triggers and what you can do about them.  You can help prevent asthma flare-ups by staying away from your triggers.      Trigger                                                          What you can do   Cigarette Smoke  Tobacco smoke can make asthma worse. Do not allow smoking in your home, car or around you.  Be sure no one smokes at a massiel day care or school.  If you smoke, ask your health care provider for ways to help you quit.  Ask family members to quit too.  Ask your health care provider for a referral to Quit Plan to help you quit smoking, or call 5-092-904-PLAN.     Colds, Flu, Bronchitis  These are common triggers of asthma. Wash your hands often.  Dont touch your eyes, nose or mouth.  Get a flu shot every year.     Dust Mites  These are tiny bugs that live in cloth or carpet. They are too small to see. Wash sheets and blankets in hot water every week.   Encase pillows and mattress in dust mite proof covers.  Avoid having carpet if you can. If you have carpet, vacuum weekly.   Use a dust mask and HEPA vacuum.   Pollen and Outdoor Mold  Some people are allergic to trees, grass, or weed pollen, or molds. Try to keep your windows closed.  Limit time out doors when pollen count is high.   Ask you health care provider about taking  medicine during allergy season.     Animal Dander  Some people are allergic to skin flakes, urine or saliva from pets with fur or feathers. Keep pets with fur or feathers out of your home.    If you cant keep the pet outdoors, then keep the pet out of your bedroom.  Keep the bedroom door closed.  Keep pets off cloth furniture and away from stuffed toys.     Mice, Rats, and Cockroaches  Some people are allergic to the waste from these pests.   Cover food and garbage.  Clean up spills and food crumbs.  Store grease in the refrigerator.   Keep food out of the bedroom.   Indoor Mold  This can be a trigger if your home has high moisture. Fix leaking faucets, pipes, or other sources of water.   Clean moldy surfaces.  Dehumidify basement if it is damp and smelly.   Smoke, Strong Odors, and Sprays  These can reduce air quality. Stay away from strong odors and sprays, such as perfume, powder, hair spray, paints, smoke incense, paint, cleaning products, candles and new carpet.   Exercise or Sports  Some people with asthma have this trigger. Be active!  Ask your doctor about taking medicine before sports or exercise to prevent symptoms.    Warm up for 5-10 minutes before and after sports or exercise.     Other Triggers of Asthma  Cold air:  Cover your nose and mouth with a scarf.  Sometimes laughing or crying can be a trigger.  Some medicines and food can trigger asthma.

## 2021-06-19 NOTE — PROGRESS NOTES
Assessment and Plan     Dee was seen today for ear pain.    Diagnoses and all orders for this visit:    Dysfunction of both eustachian tubes  -     Ambulatory referral to ENT  -     loratadine (CLARITIN) 10 mg tablet; Take 1 tablet (10 mg total) by mouth daily.       HPI     Chief Complaint   Patient presents with     Ear Pain     Sx. started 1/wk ago after being treated with an Antibiotic for ear infection       Dee Sullivan is a 18 y.o. female seen today for bilateral ear pain, left worse than right.  She notes a frequent sensation of fullness in both ears.  Also notes popping when she yawns.  Similar symptoms have been present since mid April of this year.  Diagnosed with eustachian dysfunction on 5/24, prescribed fluticasone. After 30 days of worsening symptoms, diagnosed with right OM and left OME and prescribed amoxicillin. Symptoms improved but have since returned.  No rhinorrhea or nasal congestion.    Works as a , has been reducing her time in the water since she started having ear problems.        Current Outpatient Prescriptions:      albuterol (PROAIR HFA;PROVENTIL HFA;VENTOLIN HFA) 90 mcg/actuation inhaler, Inhale 2 puffs every 6 (six) hours as needed for wheezing., Disp: 1 Inhaler, Rfl: 2     ergocalciferol (VITAMIN D2) 50,000 unit capsule, Take 1 capsule (50,000 Units total) by mouth every 7 days., Disp: 4 capsule, Rfl: 0     fluticasone (FLONASE ALLERGY RELIEF) 50 mcg/actuation nasal spray, 1 spray into each nostril daily., Disp: 16 g, Rfl: 0     levonorgestrel-ethinyl estradiol (AVIANE) 0.1-20 mg-mcg per tablet, Take 1 tablet by mouth daily., Disp: 90 tablet, Rfl: 3     SUMAtriptan (IMITREX) 50 MG tablet, Take 1 tablet (50 mg total) by mouth every 2 (two) hours as needed for migraine., Disp: 10 tablet, Rfl: 0     loratadine (CLARITIN) 10 mg tablet, Take 1 tablet (10 mg total) by mouth daily., Disp: 30 tablet, Rfl: 2     Reviewed and updated: medical history,  medications and allergies.     Review of Systems     General: Denies fever, chills, fatigue.  Respiratory: Denies dyspnea, cough, wheezing.     Objective     Vitals:    07/26/18 1000   BP: 100/68   Patient Site: Right Arm   Patient Position: Sitting   Cuff Size: Adult Regular   Pulse: 67   Resp: 18   Temp: 98  F (36.7  C)   TempSrc: Oral   SpO2: 100%   Weight: 132 lb 12.8 oz (60.2 kg)        Reviewed vital signs.  General: Appears calm, comfortable. Answers questions quickly and appropriately with clear speech. No apparent distress.  Skin: Pink, warm, dry.  HENT: Normocephalic, atraumatic.  Left TM and canal clear.  Right TM is retracted without erythema or visible fluid.  Canals clear bilaterally.  Neck: Supple, without lymphadenopathy.  Cardiovascular: Strong, regular radial pulse.  Respiratory: Normal respiratory effort.  Neuro: Memory and cognition appear normal. Normal gait.  Psych: Mood and affect appear normal.     Imaging:   No results found.    Labs:  No results found for this or any previous visit (from the past 24 hour(s)).     Medical Decision-Making     Dee is well-appearing 18-year-old female presents with recurrent pressure and fullness in both ears.  She did have an otitis couple months ago that responded to amoxicillin.  Symptoms never completely resolved.  Her appearance is nontoxic.  She is not tachycardic, tachypneic, or febrile.  Left ear appears normal today without signs of effusion.  Right TM is retracted but again without evidence of effusion or infection.  We discussed the mechanism of eustachian tube dysfunction and conservative home management of same.  She will resume using fluticasone and antihistamines.  Referred to ENT as her symptoms have been persistent for more than 3 months.    Reviewed red flags that would trigger a prompt return to the clinic as noted below under patient instructions.  She expressed understanding of these directions and is in agreement with the plan.      Patient Instructions     Patient Instructions   Use the fluticasone and loratadine to try to help promote drainage of your ears via the eustachian tube.    Since your symptoms have been present for over 3 months, I'm referring you to the Ear, Nose, and Throat doctors (ENT).    Please return to the clinic if you notice any of the following:    High fever that does not respond to Tylenol or ibuprofen.    Pain seems to be getting worse instead of better.    Liquid coming from either ear.      Discussed benefit vs risk of medications, dosing, side effects.  Patient was able to verbalize understanding.  After visit summary was provided for patient.     Bib Bernal PA-C

## 2021-06-20 NOTE — PROGRESS NOTES
Assessment & Plan:    1. Pre-procedure lab exam  Pregnancy (Beta-hCG, Qual), Urine   2. Screen for STD (sexually transmitted disease)  Chlamydia trachomatis & Neisseria gonorrhoeae, Amplified Detection   3. PCOS (polycystic ovarian syndrome)-she has history of irregular menses, hirsutism and polycystic ovaries seen on ultrasound.  She meets criteria.  She has had Nexplanon as well as OCP in the past.  We discussed at length her PCO S and what her goals are in terms of treating her PCO S.  Her main goals are to reduce her risk of pregnancy and to get regular periods.  She is not as concerned about her hirsutism and ovulation/fertility.  Given that her goals include having regular menses, we discussed that the Mirena IUD with progestin only main or may not give her it regular periods.  She may experience no menses or irregular menses with an IUD.  Though an IUD containing progestin is indicated for PCO S treatment oral contraceptive pill is first-line therapy.  We also discussed the metabolic impact of PCO S.  Given her goals she is chosen to go ahead and try again the OCP in combination with metformin the next 3 months and see if we can get her irregular menses under better control.  We discussed ways for her to get better reminder system to take her medications every day.  This includes taking her placebo pills at the end of her pill pack.  Norgestrel-ethinyl estradiol (LO/OVRAL) 0.3-30 mg-mcg per tablet.  She was start today taking the Sunday pill and today's pill today and then 1 pill thereafter every day.  She just had her menstrual period that ended this weekend.  She will return for follow-up in 3 months with her PCP or with this writer her choice.  If she still has trouble with compliance of taking her OCP we will put in an IUD.  Recommend using a backup form of contraception such as condoms for the next 6 weeks.    metFORMIN (GLUCOPHAGE XR) 500 MG 24 hr tablet daily.   4. Contraception  norgestrel-ethinyl  "estradiol (LO/OVRAL) 0.3-30 mg-mcg per tablet      Orders Placed This Encounter   Procedures     Chlamydia trachomatis & Neisseria gonorrhoeae, Amplified Detection     Order Specific Question:   Specimen Source?     Answer:   Urine     Pregnancy (Beta-hCG, Qual), Urine       See patient instructions     Subjective:     Dee is a 18 y.o. female who is accompanied by her best friend here with complaint of symptoms of PCO S.  Her main complaint is irregular periods and she wants to prevent pregnancy.  She is sexually active with one partner and only has had one partner.  He has never been pregnant before.  Denies any history of sexually transmitted infections.  She has had Nexplanon implanted and removed last year.  She also has been on OCP in combination with Nexplanon to regulate her periods.  She was diagnosed with PCO S with irregular menses, hirsutism and polycystic ovaries.  The things that bother her the most is her irregular menses.  Her hirsutism does not seem to bother her as much.  She was scheduled to get an IUD insertion today.  Denies any vaginal discharge.  Denies any abdominal or pelvic pain.    PMHx, SocHx, FHX reviewed and updated     No Known Allergies  Current Outpatient Prescriptions on File Prior to Visit   Medication Sig Dispense Refill     albuterol (PROAIR HFA;PROVENTIL HFA;VENTOLIN HFA) 90 mcg/actuation inhaler Inhale 2 puffs every 6 (six) hours as needed for wheezing. 1 Inhaler 2     SUMAtriptan (IMITREX) 50 MG tablet Take 1 tablet (50 mg total) by mouth every 2 (two) hours as needed for migraine. 10 tablet 0     No current facility-administered medications on file prior to visit.        Objective:   /62  Pulse 70  Ht 4' 11.5\" (1.511 m)  Wt 130 lb (59 kg)  BMI 25.82 kg/m2   .Exam:  Gen: alert and nontoxic appearing  HEENT: bilateral TM's and external ear canals normal; nose:normal; mouth: MMM no lesions  Neck: no lymphadenopathy  Lungs: clear to auscultation bilaterally  CV: " normal rate, regular rhythm, normal S1, S2, no murmurs, rubs, clicks or gallops  Abd: NL BS, NT/ND; no HSM or masses.    Skin: no rash:     Labs:   Results for orders placed or performed in visit on 09/24/18   Pregnancy (Beta-hCG, Qual), Urine   Result Value Ref Range    Pregnancy Test, Urine Negative Negative    Specific Gravity, UA 1.025 1.001 - 1.030     GC/Chlamydia pending   Total time spent with patient and family was 45 minutes; greater than 50% of the time was on education regarding counseling on contraception and PCOS treatment options.      Jaye Walker MD

## 2021-06-20 NOTE — LETTER
Letter by Estiven Land MD at      Author: Estiven Land MD Service: -- Author Type: --    Filed:  Encounter Date: 1/30/2020 Status: (Other)         Dee Sullivan  [address] [place] [address]    1/30/2020      To Parents of Dee:      We've noticed your child hasn't been in to our clinic for a check up on her asthma for some time. We would like to see Dee because regular check ups are an important part of maintaining health. Your child may also need an update on vaccinations. Please make an appointment with your primary care provider at your earliest convenience.     If you have any questions or concerns, please contact us at [phone] or via [employer].        Thank you,    Estiven Land MD

## 2021-06-20 NOTE — PROGRESS NOTES
Assessment     1. Missed period    2. Pelvic pain in female    Possibly a ruptured ovarian cyst or other problem with ovaries.  Patient with elevated testosterone according to her at my previous clinic.  Likely PCOS.    Plan:     Ultrasound at [facility].   Due to labs previously c/w PCOS I told her she needs to explore other forms of birth control.  Since she isn't good at OCP's I recommend an IUD.      Subjective:      HPI: Dee Sullivan is a 18 y.o. female who presents with for missed period and abdominal cramping. She was supposed to get her period on 8/23. She had a negative UPT on 8/28. She had her nexplanon removed in January or February 2018, after a 6 month long bleeding period. She had been having regular periods since her nexplanon was removed. Normal discharge and urination. She was given birth control pills when her nexplanon was placed but found it difficult to remember to take this everyday.     Abdominal Pain: She has been having crampy abdominal pain which comes and goes for the past 2-3 weeks. She last experienced this pain this morning. Normally her stomach aches go away after she eats something. She does not think anything makes it better. She has to take short breaths until the pain subsides. She has not taken any medications for this. She feels she has gained weight. She feels more bloated after she eats.     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 She exercises regularly.     No past medical history on file.  No past surgical history on file.  Review of patient's allergies indicates no known allergies.  Outpatient Medications Prior to Visit   Medication Sig Dispense Refill     albuterol (PROAIR HFA;PROVENTIL HFA;VENTOLIN HFA) 90 mcg/actuation inhaler Inhale 2 puffs every 6 (six) hours as needed for wheezing. 1 Inhaler 2     SUMAtriptan (IMITREX) 50 MG tablet Take 1 tablet (50 mg total) by mouth every 2 (two) hours as needed for migraine. 10 tablet 0      ergocalciferol (VITAMIN D2) 50,000 unit capsule Take 1 capsule (50,000 Units total) by mouth every 7 days. 4 capsule 0     fluticasone (FLONASE ALLERGY RELIEF) 50 mcg/actuation nasal spray 1 spray into each nostril daily. 16 g 0     levonorgestrel-ethinyl estradiol (AVIANE) 0.1-20 mg-mcg per tablet Take 1 tablet by mouth daily. 90 tablet 3     loratadine (CLARITIN) 10 mg tablet Take 1 tablet (10 mg total) by mouth daily. 30 tablet 2     No facility-administered medications prior to visit.      Family History   Problem Relation Age of Onset     Hypothyroidism Mother      Hypertension Mother      Hypertension Brother      Hypertension Maternal Grandmother      Hyperlipidemia Maternal Grandmother      Arrhythmia Maternal Grandmother      No Medical Problems Father      No Medical Problems Paternal Grandmother      Asthma Brother      Social History     Social History Narrative     Patient Active Problem List   Diagnosis   (none) - all problems resolved or deleted       Review of Systems  Remainder of 12 point ROS negative      Objective:     Vitals:    09/07/18 1451   BP: 102/60   Pulse: 60   Weight: 131 lb 9.6 oz (59.7 kg)       Physical Exam:     Alert, no acute distress.   HEENT, conjunctivae are clear, TMs are without erythema, pus or fluid. Position and landmarks are normal.  Nose is clear.  Oropharynx is moist and clear, without tonsillar hypertrophy, asymmetry, exudate or lesions.  Neck is supple without adenopathy or thyromegaly.  Lungs have good air entry bilaterally, no wheezes or crackles.  No prolongation of expiratory phase.   No tachypnea, retractions, or increased work of breathing.  Cardiac exam regular rate and rhythm, normal S1 and S2.  Abdomen is soft and nontender, bowel sounds are present, no hepatosplenomegaly or mass palpable.  Skin, clear without rash  Neuro, moving all extremities equally, normal muscle tone in all 4 extremities, deep tendon reflexes 2+ over 4 at both patellae and  ankles.      ADDITIONAL HISTORY SUMMARIZED (2): None.  DECISION TO OBTAIN EXTRA INFORMATION (1): None.   RADIOLOGY TESTS (1): Ordered abdominal US.   LABS (1): Ordered UPT, negative.  MEDICINE TESTS (1): None.  INDEPENDENT REVIEW (2 each): None.     The visit lasted a total of 16 minutes face to face with the patient. Over 50% of the time was spent counseling and educating the patient about missed period and abdominal cramping.    I, Sarah Brooks, am scribing for and in the presence of, Dr. Land.    I, Dr. Land, personally performed the services described in this documentation, as scribed by Sarah Brooks in my presence, and it is both accurate and complete.    Total data points: 2

## 2021-06-26 NOTE — PROGRESS NOTES
Progress Notes by Mihir Boss PA-C at 5/24/2018 10:50 AM     Author: Mihir Boss PA-C Service: -- Author Type: Physician Assistant    Filed: 5/24/2018  1:01 PM Encounter Date: 5/24/2018 Status: Signed    : Mihir Boss PA-C (Physician Assistant)       Subjective:      Patient ID: Dee Sullivan is a 18 y.o. female.    Chief Complaint:    HPI  Dee Sullivan is a 18 y.o. female who presents today complaining of right-sided ear pain.  Is made worse with dependency laying down at night.  She also has pain when she submerges her head underwater when she is swimming.  No hearing or balance deficits.  No otorrhea.  No,fever chills night sweats cough rhinorrhea.    She is also requesting a refill for albuterol since she is out.  States that she uses it for exercise-induced asthma 20 minutes prior to exercise.    No past medical history on file.    No past surgical history on file.    Family History   Problem Relation Age of Onset   ? Hypothyroidism Mother    ? Hypertension Mother    ? Hypertension Brother    ? Hypertension Maternal Grandmother    ? Hyperlipidemia Maternal Grandmother    ? Arrhythmia Maternal Grandmother    ? No Medical Problems Father    ? No Medical Problems Paternal Grandmother    ? Asthma Brother        Social History   Substance Use Topics   ? Smoking status: Never Smoker   ? Smokeless tobacco: Never Used      Comment: no secondhand exposure   ? Alcohol use None       Review of Systems  As above in HPI, otherwise negative.    Objective:     /64  Pulse 66  Temp 97.4  F (36.3  C) (Oral)   Resp 18  Wt 132 lb 3.2 oz (60 kg)  SpO2 98%    Physical Exam  General: Patient is resting comfortably no acute distress is afebrile  HEENT: Head is normocephalic atraumatic   eyes are PERRL EOMI sclera anicteric   TMs on the right is with dull cone of light reflex and fluid in the middle ear.  The right TM is clear.  Bilateral external auditory canals are clear.  Throat is with mild  pharyngeal wall erythema and mild exudate  No cervical lymphadenopathy present  Lungs: Clear to auscultation bilaterally  Heart: Regular rate and rhythm  Skin: Without rash non-diaphoretic      Assessment:     Procedures    The primary encounter diagnosis was ETD (Eustachian tube dysfunction), right. A diagnosis of Medication refill was also pertinent to this visit.    Plan:     1. ETD (Eustachian tube dysfunction), right  fluticasone (FLONASE ALLERGY RELIEF) 50 mcg/actuation nasal spray   2. Medication refill  albuterol (PROAIR HFA;PROVENTIL HFA;VENTOLIN HFA) 90 mcg/actuation inhaler         Patient Instructions         As a result of our visit today, here are the action plans for you:    1. Medication(s) to stop:   Medications Discontinued During This Encounter   Medication Reason   ? albuterol (PROVENTIL HFA;VENTOLIN HFA) 90 mcg/actuation inhaler Reorder       2. Medication(s) to start or change:   Medications Ordered   Medications   ? albuterol (PROAIR HFA;PROVENTIL HFA;VENTOLIN HFA) 90 mcg/actuation inhaler     Sig: Inhale 2 puffs every 6 (six) hours as needed for wheezing.     Dispense:  1 Inhaler     Refill:  0   ? fluticasone (FLONASE ALLERGY RELIEF) 50 mcg/actuation nasal spray     Si spray into each nostril daily.     Dispense:  16 g     Refill:  0       3. Other instructions: Yes         Earache, No Infection (Adult)  Earaches can happen without an infection. This occurs when air and fluid build up behind the eardrum causing a feeling of fullness and discomfort and reduced hearing. This is called otitis media with effusion (OME) or serous otitis media. It means there is fluid in the middle ear. It is not the same as acute otitis media, which is typically from infection.  OME can happen when you have a cold if congestion blocks the passage that drains the middle ear. This passage is called the eustachian tube. OME may also occur with nasal allergies or after a bacterial middle ear infection.    The  pain or discomfort may come and go. You may hear clicking or popping sounds when you chew or swallow. You may feel that your balance is off. Or you may hear ringing in the ear.  It often takes from several weeks up to 3 months for the fluid to clear on its own. Oral pain relievers and ear drops help if there is pain. Decongestants and antihistamines sometimes help. Antibiotics don't help since there is no infection. Your doctor may prescribe a nasal spray to help reduce swelling in the nose and eustachian tube. This can allow the ear to drain.  If your OME doesn't improve after 3 months, surgery may be used to drain the fluid and insert a small tube in the eardrum to allow continued drainage.  Because the middle ear fluid can become infected, it is important to watch for signs of an ear infection which may develop later. These signs include increased ear pain, fever, or drainage from the ear.  Home care  The following guidelines will help you care for yourself at home:    You may use over-the-counter medicine as directed to control pain, unless another medicine was prescribed. If you have chronic liver or kidney disease or ever had a stomach ulcer or GI bleeding, talk with your doctor before using these medicines. Aspirin should never be used in anyone under 18 years of age who is ill with a fever. It may cause severe liver damage.    You may use over-the-counter decongestants such as phenylephrine or pseudoephedrine. But they are not always helpful. Don't use nasal spray decongestants more than 3 days. Longer use can make congestion worse. Prescription nasal sprays from your doctor don't typically have those restrictions.    Antihistamines may help if you are also having allergy symptoms.    You may use medicines such as guaifenesin to thin mucus and promote drainage.  Follow-up care  Follow up with your healthcare provider or as advised if you are not feeling better after 3 days.  When to seek medical advice  Call  your healthcare provider right away if any of the following occur:    Your ear pain gets worse or does not start to improve     Fever of 100.4 F (38 C) or higher, or as directed by your healthcare provider    Fluid or blood draining from the ear    Headache or sinus pain    Stiff neck    Unusual drowsiness or confusion  Date Last Reviewed: 10/1/2016    6199-0001 [employer]. [address] [place] [address]. All rights reserved. This information is not intended as a substitute for professional medical care. Always follow your healthcare professional's instructions.

## 2021-07-14 PROBLEM — Z97.5 NEXPLANON IN PLACE: Status: RESOLVED | Noted: 2017-08-22 | Resolved: 2018-02-14

## 2021-08-01 DIAGNOSIS — Z78.9 USES BIRTH CONTROL: Primary | ICD-10-CM

## 2021-08-04 RX ORDER — NORGESTREL-ETHINYL ESTRADIOL 0.3-0.03MG
TABLET ORAL
Qty: 28 TABLET | Refills: 3 | Status: SHIPPED | OUTPATIENT
Start: 2021-08-04

## 2021-09-09 NOTE — PATIENT INSTRUCTIONS
Aleve (Naproxen) 220mg - 2 tabs 2x/day for up to one week  Equivalent dose of Advil (ibuprofen) - 3 tablets 3x/day for one week  PT for HEP progression and manual therapies  Contact me on MyChart, f/u as needed      Thanks for coming today.  Ortho/[facility] Medicine Clinic  [address] [place] [address]    To schedule future appointments in Ortho Clinic, you may call [phone].    To schedule ordered imaging by your Provider: Call [facility] at [phone]    [employer] available online at:   [email or web address]    Please call if any further questions or concerns [phone] and ask for the Orthopedic Department. Clinic hours 8 am to 5 pm.    Return to clinic if symptoms worsen.

## 2021-09-09 NOTE — PROGRESS NOTES
Dee Sullivan  :  2000  DOS: 2021  MRN: [id number]    Sports Medicine Clinic Visit    PCP: not at this time    Dee Sullivan is a 21 year old female who is seen as a self referral presenting with right sided neck pain    Injury: Gradual onset of pain over the past month.     Pain located over right neck and shoulder, nonradiating.  Reports constant radiating, pain in her neck and into the shoulder.  Additional Features:  Positive: systemic symptoms.  Symptoms are better with No Treatment tried to date.  Symptoms are worse with: overhead motions: while working out and sleeping.  Other evaluation and/or treatments so far consists of: Nothing.  Recent imaging completed: No recent imaging completed.  Prior History of related problems: none    Social History: currently employed as call center/        Review of Systems  Musculoskeletal: as above  Remainder of review of systems is negative including constitutional, CV, pulmonary, GI, Skin and Neurologic except as noted in HPI or medical history.    Past Medical History:   Diagnosis Date     Asthma      No past surgical history on file.  Family History   Problem Relation Age of Onset     Hypothyroidism Mother      Hypertension Mother      Hypertension Brother      Hypertension Maternal Grandmother      Hyperlipidemia Maternal Grandmother      Arrhythmia Maternal Grandmother      No Known Problems Father      No Known Problems Paternal Grandmother      Asthma Brother        Objective  Ht 1.524 m (5')   Wt 70.8 kg (156 lb)   BMI 30.47 kg/m        General: healthy, alert and in no distress      HEENT: no scleral icterus or conjunctival erythema     Skin: no suspicious lesions or rash. No jaundice.     CV: regular rhythm by palpation, 2+ distal pulses, no pedal edema      Resp: normal respiratory effort without conversational dyspnea     Psych: normal mood and affect      Gait: nonantalgic, appropriate coordination and balance     Neuro: normal  light touch sensory exam of the extremities. Motor strength as noted below     Cervical Spine Exam  Inspection: normal cervical lordosis  Tender:  medial border of scapula, superior angle of scapula, right paracervical muscles, right levator, supraspinatus, medial rhomboids and upper trapezius muscles  Non-tender:  spinous processes  Range of Motion:  Full ROM of cervical spine, pain mildly with SB, L>R  Strength: Full strength of all neck muscles  Special tests:  Spurling's - negative - left, Spurling's - negative - right, neg adson's      Radiology:  No formal imaging today    Assessment:  1. Cervicalgia    2. Scapular dysfunction    3. Myalgia, other site    4. Strain of neck muscle, initial encounter        Plan:  Discussed the assessment with the patient.  Follow up: as needed based on clinical progress  Recent increase in wt lifting, overhead dumbbell activity, precipitating upper shoulder nd pericervical spasm and pain  Reviewed option for TPI in the future  Reassuring normal shoulder exam, mild positional pain in cervical spine, no radicular or nerve tension signs  Start PT for manual therapy, HEP progression, exercise guidance  Consider imaging if pain worsens or does not improve  We discussed modified progressive pain-free activity as tolerated  Supportive care reviewed  All questions were answered today  Contact us with additional questions or concerns  Signs and sx of concern reviewed      Travis Chang DO, CAPETER  Sports Medicine Physician  [facility]            Disclaimer: This note consists of symbols derived from keyboarding, dictation and/or voice recognition software. As a result, there may be errors in the script that have gone undetected. Please consider this when interpreting information found in this chart.

## 2021-09-11 ENCOUNTER — OFFICE VISIT (OUTPATIENT)
Dept: ORTHOPEDICS | Facility: CLINIC | Age: 21
End: 2021-09-11
Payer: COMMERCIAL

## 2021-09-11 VITALS — WEIGHT: 156 LBS | BODY MASS INDEX: 30.63 KG/M2 | HEIGHT: 60 IN

## 2021-09-11 DIAGNOSIS — M89.9 SCAPULAR DYSFUNCTION: ICD-10-CM

## 2021-09-11 DIAGNOSIS — S16.1XXA STRAIN OF NECK MUSCLE, INITIAL ENCOUNTER: ICD-10-CM

## 2021-09-11 DIAGNOSIS — M79.18 MYALGIA, OTHER SITE: ICD-10-CM

## 2021-09-11 DIAGNOSIS — M54.2 CERVICALGIA: Primary | ICD-10-CM

## 2021-09-11 PROCEDURE — 99203 OFFICE O/P NEW LOW 30 MIN: CPT | Performed by: FAMILY MEDICINE

## 2021-09-11 ASSESSMENT — MIFFLIN-ST. JEOR: SCORE: 1394.11

## 2021-09-11 NOTE — LETTER
2021         RE: Dee Sullivan  [address]  [place] [address]        Dear Colleague,    Thank you for referring your patient, Dee Sullivan, to the [facility]. Please see a copy of my visit note below.    Dee Sullivan  :  2000  DOS: 2021  MRN: [id number]    Sports Medicine Clinic Visit    PCP: not at this time    Dee Sullivan is a 21 year old female who is seen as a self referral presenting with right sided neck pain    Injury: Gradual onset of pain over the past month.     Pain located over right neck and shoulder, nonradiating.  Reports constant radiating, pain in her neck and into the shoulder.  Additional Features:  Positive: systemic symptoms.  Symptoms are better with No Treatment tried to date.  Symptoms are worse with: overhead motions: while working out and sleeping.  Other evaluation and/or treatments so far consists of: Nothing.  Recent imaging completed: No recent imaging completed.  Prior History of related problems: none    Social History: currently employed as call center/        Review of Systems  Musculoskeletal: as above  Remainder of review of systems is negative including constitutional, CV, pulmonary, GI, Skin and Neurologic except as noted in HPI or medical history.    Past Medical History:   Diagnosis Date     Asthma      No past surgical history on file.  Family History   Problem Relation Age of Onset     Hypothyroidism Mother      Hypertension Mother      Hypertension Brother      Hypertension Maternal Grandmother      Hyperlipidemia Maternal Grandmother      Arrhythmia Maternal Grandmother      No Known Problems Father      No Known Problems Paternal Grandmother      Asthma Brother        Objective  Ht 1.524 m (5')   Wt 70.8 kg (156 lb)   BMI 30.47 kg/m        General: healthy, alert and in no distress      HEENT: no scleral icterus or conjunctival erythema     Skin: no suspicious lesions or  rash. No jaundice.     CV: regular rhythm by palpation, 2+ distal pulses, no pedal edema      Resp: normal respiratory effort without conversational dyspnea     Psych: normal mood and affect      Gait: nonantalgic, appropriate coordination and balance     Neuro: normal light touch sensory exam of the extremities. Motor strength as noted below     Cervical Spine Exam  Inspection: normal cervical lordosis  Tender:  medial border of scapula, superior angle of scapula, right paracervical muscles, right levator, supraspinatus, medial rhomboids and upper trapezius muscles  Non-tender:  spinous processes  Range of Motion:  Full ROM of cervical spine, pain mildly with SB, L>R  Strength: Full strength of all neck muscles  Special tests:  Spurling's - negative - left, Spurling's - negative - right, neg adson's      Radiology:  No formal imaging today    Assessment:  1. Cervicalgia    2. Scapular dysfunction    3. Myalgia, other site    4. Strain of neck muscle, initial encounter        Plan:  Discussed the assessment with the patient.  Follow up: as needed based on clinical progress  Recent increase in wt lifting, overhead dumbbell activity, precipitating upper shoulder nd pericervical spasm and pain  Reviewed option for TPI in the future  Reassuring normal shoulder exam, mild positional pain in cervical spine, no radicular or nerve tension signs  Start PT for manual therapy, HEP progression, exercise guidance  Consider imaging if pain worsens or does not improve  We discussed modified progressive pain-free activity as tolerated  Supportive care reviewed  All questions were answered today  Contact us with additional questions or concerns  Signs and sx of concern reviewed      Travis Chang DO, CAQ  Sports Medicine Physician  [facility]            Disclaimer: This note consists of symbols derived from keyboarding, dictation and/or voice recognition software. As a result, there may be errors  in the script that have gone undetected. Please consider this when interpreting information found in this chart.        Again, thank you for allowing me to participate in the care of your patient.        Sincerely,        Travis Chang, DO

## 2021-10-10 ENCOUNTER — HEALTH MAINTENANCE LETTER (OUTPATIENT)
Age: 21
End: 2021-10-10

## 2022-03-27 ENCOUNTER — HEALTH MAINTENANCE LETTER (OUTPATIENT)
Age: 22
End: 2022-03-27

## 2022-09-24 ENCOUNTER — HEALTH MAINTENANCE LETTER (OUTPATIENT)
Age: 22
End: 2022-09-24

## 2023-05-08 ENCOUNTER — HEALTH MAINTENANCE LETTER (OUTPATIENT)
Age: 23
End: 2023-05-08

## 2024-07-14 ENCOUNTER — HEALTH MAINTENANCE LETTER (OUTPATIENT)
Age: 24
End: 2024-07-14